# Patient Record
Sex: FEMALE | Race: WHITE | NOT HISPANIC OR LATINO
[De-identification: names, ages, dates, MRNs, and addresses within clinical notes are randomized per-mention and may not be internally consistent; named-entity substitution may affect disease eponyms.]

---

## 2017-02-21 ENCOUNTER — APPOINTMENT (OUTPATIENT)
Dept: OTOLARYNGOLOGY | Facility: CLINIC | Age: 72
End: 2017-02-21

## 2017-02-21 VITALS
HEART RATE: 63 BPM | WEIGHT: 134 LBS | HEIGHT: 63 IN | BODY MASS INDEX: 23.74 KG/M2 | SYSTOLIC BLOOD PRESSURE: 154 MMHG | DIASTOLIC BLOOD PRESSURE: 82 MMHG

## 2017-07-12 ENCOUNTER — TRANSCRIPTION ENCOUNTER (OUTPATIENT)
Age: 72
End: 2017-07-12

## 2017-11-13 ENCOUNTER — FORM ENCOUNTER (OUTPATIENT)
Age: 72
End: 2017-11-13

## 2017-11-14 ENCOUNTER — OUTPATIENT (OUTPATIENT)
Dept: OUTPATIENT SERVICES | Facility: HOSPITAL | Age: 72
LOS: 1 days | End: 2017-11-14
Payer: MEDICARE

## 2017-11-14 ENCOUNTER — APPOINTMENT (OUTPATIENT)
Dept: MAMMOGRAPHY | Facility: CLINIC | Age: 72
End: 2017-11-14
Payer: MEDICARE

## 2017-11-14 DIAGNOSIS — Z00.8 ENCOUNTER FOR OTHER GENERAL EXAMINATION: ICD-10-CM

## 2017-11-14 PROCEDURE — 77067 SCR MAMMO BI INCL CAD: CPT

## 2017-11-14 PROCEDURE — 77063 BREAST TOMOSYNTHESIS BI: CPT

## 2017-11-14 PROCEDURE — 77063 BREAST TOMOSYNTHESIS BI: CPT | Mod: 26

## 2017-11-14 PROCEDURE — G0202: CPT | Mod: 26

## 2018-10-24 ENCOUNTER — OTHER (OUTPATIENT)
Age: 73
End: 2018-10-24

## 2018-11-14 ENCOUNTER — FORM ENCOUNTER (OUTPATIENT)
Age: 73
End: 2018-11-14

## 2018-11-15 ENCOUNTER — APPOINTMENT (OUTPATIENT)
Dept: MAMMOGRAPHY | Facility: CLINIC | Age: 73
End: 2018-11-15
Payer: MEDICARE

## 2018-11-15 ENCOUNTER — OUTPATIENT (OUTPATIENT)
Dept: OUTPATIENT SERVICES | Facility: HOSPITAL | Age: 73
LOS: 1 days | End: 2018-11-15
Payer: MEDICARE

## 2018-11-15 DIAGNOSIS — Z00.8 ENCOUNTER FOR OTHER GENERAL EXAMINATION: ICD-10-CM

## 2018-11-15 PROCEDURE — 77063 BREAST TOMOSYNTHESIS BI: CPT

## 2018-11-15 PROCEDURE — 77067 SCR MAMMO BI INCL CAD: CPT | Mod: 26

## 2018-11-15 PROCEDURE — 77063 BREAST TOMOSYNTHESIS BI: CPT | Mod: 26

## 2018-11-15 PROCEDURE — 77067 SCR MAMMO BI INCL CAD: CPT

## 2019-01-23 ENCOUNTER — TRANSCRIPTION ENCOUNTER (OUTPATIENT)
Age: 74
End: 2019-01-23

## 2019-12-26 ENCOUNTER — TRANSCRIPTION ENCOUNTER (OUTPATIENT)
Age: 74
End: 2019-12-26

## 2020-01-10 ENCOUNTER — MESSAGE (OUTPATIENT)
Age: 75
End: 2020-01-10

## 2020-01-10 DIAGNOSIS — Z00.00 ENCOUNTER FOR GENERAL ADULT MEDICAL EXAMINATION W/OUT ABNORMAL FINDINGS: ICD-10-CM

## 2020-02-10 ENCOUNTER — FORM ENCOUNTER (OUTPATIENT)
Age: 75
End: 2020-02-10

## 2020-02-11 ENCOUNTER — OUTPATIENT (OUTPATIENT)
Dept: OUTPATIENT SERVICES | Facility: HOSPITAL | Age: 75
LOS: 1 days | End: 2020-02-11
Payer: MEDICARE

## 2020-02-11 ENCOUNTER — APPOINTMENT (OUTPATIENT)
Dept: MAMMOGRAPHY | Facility: CLINIC | Age: 75
End: 2020-02-11
Payer: MEDICARE

## 2020-02-11 DIAGNOSIS — Z00.00 ENCOUNTER FOR GENERAL ADULT MEDICAL EXAMINATION WITHOUT ABNORMAL FINDINGS: ICD-10-CM

## 2020-02-11 PROCEDURE — 77063 BREAST TOMOSYNTHESIS BI: CPT

## 2020-02-11 PROCEDURE — 77063 BREAST TOMOSYNTHESIS BI: CPT | Mod: 26

## 2020-02-11 PROCEDURE — 77067 SCR MAMMO BI INCL CAD: CPT | Mod: 26

## 2020-02-11 PROCEDURE — 77067 SCR MAMMO BI INCL CAD: CPT

## 2020-02-14 ENCOUNTER — APPOINTMENT (OUTPATIENT)
Dept: OBGYN | Facility: CLINIC | Age: 75
End: 2020-02-14
Payer: MEDICARE

## 2020-02-14 VITALS
BODY MASS INDEX: 23.92 KG/M2 | DIASTOLIC BLOOD PRESSURE: 70 MMHG | SYSTOLIC BLOOD PRESSURE: 130 MMHG | WEIGHT: 135 LBS | HEIGHT: 63 IN

## 2020-02-14 DIAGNOSIS — H69.83 OTHER SPECIFIED DISORDERS OF EUSTACHIAN TUBE, BILATERAL: ICD-10-CM

## 2020-02-14 DIAGNOSIS — M67.40 GANGLION, UNSPECIFIED SITE: ICD-10-CM

## 2020-02-14 DIAGNOSIS — S52.562A BARTON'S FRACTURE OF LEFT RADIUS, INITIAL ENCOUNTER FOR CLOSED FRACTURE: ICD-10-CM

## 2020-02-14 DIAGNOSIS — Z86.79 PERSONAL HISTORY OF OTHER DISEASES OF THE CIRCULATORY SYSTEM: ICD-10-CM

## 2020-02-14 DIAGNOSIS — H65.93 UNSPECIFIED NONSUPPURATIVE OTITIS MEDIA, BILATERAL: ICD-10-CM

## 2020-02-14 PROCEDURE — G0101: CPT

## 2020-02-14 NOTE — PHYSICAL EXAM
[Awake] : awake [Alert] : alert [Acute Distress] : no acute distress [LAD] : no lymphadenopathy [Thyroid Nodule] : no thyroid nodule [Goiter] : no goiter [Mass] : no breast mass [Nipple Discharge] : no nipple discharge [Soft] : soft [Axillary LAD] : no axillary lymphadenopathy [H/Smegaly] : no hepatosplenomegaly [Distended] : not distended [Tender] : non tender [Oriented x3] : oriented to person, place, and time [Depressed Mood] : not depressed [Flat Affect] : affect not flat [Normal] : cervix [Atrophy] : atrophy [Anteversion] : anteverted [No Bleeding] : there was no active vaginal bleeding [RRR, No Murmurs] : RRR, no murmurs [Tenderness] : nontender [Uterine Adnexae] : were not tender and not enlarged [CTAB] : CTAB

## 2020-02-14 NOTE — CHIEF COMPLAINT
[Initial Visit] : initial GYN visit [FreeTextEntry1] : 75YO P2 LMP 1994 presents for checkup without complaints.

## 2020-02-14 NOTE — HISTORY OF PRESENT ILLNESS
[___ Year(s) Ago] : [unfilled] year(s) ago [Healthy Diet] : a healthy diet [Good] : being in good health [Regular Exercise] : regular exercise [Last Mammogram ___] : Last Mammogram was [unfilled] [Last Pap ___] : Last cervical pap smear was [unfilled] [Postmenopausal] : is postmenopausal [Pregnancy History] : pregnancy history: [Up to Date] : up to date with ~his/her~ STD screening [Weight Concerns] : no concerns with her weight [Menstrual Problems] : reports normal menses

## 2020-02-21 LAB — CYTOLOGY CVX/VAG DOC THIN PREP: ABNORMAL

## 2021-04-06 ENCOUNTER — RESULT REVIEW (OUTPATIENT)
Age: 76
End: 2021-04-06

## 2021-04-06 ENCOUNTER — OUTPATIENT (OUTPATIENT)
Dept: OUTPATIENT SERVICES | Facility: HOSPITAL | Age: 76
LOS: 1 days | End: 2021-04-06
Payer: MEDICARE

## 2021-04-06 ENCOUNTER — APPOINTMENT (OUTPATIENT)
Dept: MAMMOGRAPHY | Facility: CLINIC | Age: 76
End: 2021-04-06
Payer: MEDICARE

## 2021-04-06 DIAGNOSIS — Z00.00 ENCOUNTER FOR GENERAL ADULT MEDICAL EXAMINATION WITHOUT ABNORMAL FINDINGS: ICD-10-CM

## 2021-04-06 PROCEDURE — 77063 BREAST TOMOSYNTHESIS BI: CPT | Mod: 26

## 2021-04-06 PROCEDURE — 77067 SCR MAMMO BI INCL CAD: CPT

## 2021-04-06 PROCEDURE — 77067 SCR MAMMO BI INCL CAD: CPT | Mod: 26

## 2021-04-06 PROCEDURE — 77063 BREAST TOMOSYNTHESIS BI: CPT

## 2022-09-23 ENCOUNTER — NON-APPOINTMENT (OUTPATIENT)
Age: 77
End: 2022-09-23

## 2022-11-29 ENCOUNTER — APPOINTMENT (OUTPATIENT)
Dept: ORTHOPEDIC SURGERY | Facility: CLINIC | Age: 77
End: 2022-11-29

## 2022-11-29 VITALS
WEIGHT: 135 LBS | SYSTOLIC BLOOD PRESSURE: 144 MMHG | BODY MASS INDEX: 23.92 KG/M2 | HEART RATE: 54 BPM | DIASTOLIC BLOOD PRESSURE: 70 MMHG | HEIGHT: 63 IN

## 2022-11-29 PROCEDURE — 99204 OFFICE O/P NEW MOD 45 MIN: CPT

## 2022-11-29 PROCEDURE — 73564 X-RAY EXAM KNEE 4 OR MORE: CPT | Mod: RT

## 2022-11-29 NOTE — ADDENDUM
[FreeTextEntry1] : This note was written by Lourdes Chirinos on 11/29/2022 acting solely as a scribe for Dr. Wayne Cabral.\par \par All medical record entries made by the Scribe were at my, Dr. Wayne Cabral, direction and personally dictated by me on 11/29/2022. I have personally reviewed the chart and agree that the record accurately reflects my personal performance of the history, physical exam, assessment and plan.

## 2022-11-29 NOTE — DISCUSSION/SUMMARY
[de-identified] : 78 y/o female with right knee popliteal cyst\par \par A discussion was had with the patient regarding popliteal synovial cysts, commonly known as a "baker cyst". Synovial cysts are a common condition that causes pain through the posterior knee. They are typically secondary to degenerative or inflammatory joint disease with communication to the knee joint, but may be caused by underlying internal derangement. When symptomatic, they present as pain and stiffness or swelling through the posterior knee worsened with activity. They are at risk of enlargement, rupture or pressure on neurovascular structures through the posterior joint.\par \par Management of the cysts includes treatment of the underlying joint disorder if symptomatic with intra-articular injection of steroid. Local aspiration and injection may be performed under ultrasound guidance with potential recurrence. However, synovial cysts tend to respond to local cryotherapy, activity modification, and anti-inflammatory medication.\par \par Recommendation: Conservative care & observation; including rest/activity avoidance until less symptomatic with subsequent gradual return to full low impact activity as tolerated. Patient may also use OTC NSAIDs or acetaminophen as tolerated, with application of ice/heat to the area 2-3x daily for 20 minutes after periods of activity. \par \par Follow up as needed if pain persists for further evaluation and treatment.

## 2022-11-29 NOTE — HISTORY OF PRESENT ILLNESS
[de-identified] : 77 year old female present today with right knee pain x 1 week. No injury reported. The pain has improved  is brought on with stairs usage, getting out chair and driving. Patient is very active any plays pickle ball 2 x per week but did not play last week. No taking pain medication. Localizes pain to the posterior aspect of the knee. Denies locking, catching, numbness or tingling. \par \par The patient's past medical history, past surgical history, medications and allergies were reviewed by me today with the patient and documented accordingly. In addition, the patient's family and social history, which were noncontributory to this visit, were reviewed also.

## 2022-11-29 NOTE — PHYSICAL EXAM
[de-identified] : Oriented to time, place, person\par Mood: Normal\par Affect: Normal\par Appearance: Healthy, well appearing, no acute distress.\par Gait: Normal\par Assistive Devices: None\par \par Right Knee Exam:\par \par Skin: Clean, dry, intact\par Inspection: No obvious malalignment, no masses, no swelling, no effusion\par Pulses: 2+ DP/PT pulses \par ROM: 0-135 degrees of flexion. No pain with deep knee flexion/extension.\par Tenderness: No MJLT. No LJLT. No pain over the patella facets. No pain to the quadriceps tendon. No pain to the patella tendon. No posterior knee tenderness.\par Stability: Stable to varus, valgus. Negative Lachman testing. Negative anterior drawer, negative posterior drawer.\par Strength: 5/5 Q/H/TA/GS/EHL, without atrophy\par Neuro: Intact to light touch throughout, DTRs normal\par Additional Tests: Negative Amanda's test, Negative patellar grind test  [de-identified] : The following radiographs were ordered and read by me during this patients visit. I reviewed each radiograph in detail with the patient and discussed the findings as highlighted below. \par \par 4 views of the right knee were obtained today, 11/29/2022, that show no acute fracture or dislocation. There is minimal medial, minimal lateral and mild patellofemoral degenerative changes seen. There is no significant malalignment. No significant other obvious osseous abnormality, otherwise unremarkable.

## 2023-01-27 ENCOUNTER — APPOINTMENT (OUTPATIENT)
Dept: MAMMOGRAPHY | Facility: CLINIC | Age: 78
End: 2023-01-27
Payer: MEDICARE

## 2023-01-27 ENCOUNTER — OUTPATIENT (OUTPATIENT)
Dept: OUTPATIENT SERVICES | Facility: HOSPITAL | Age: 78
LOS: 1 days | End: 2023-01-27
Payer: MEDICARE

## 2023-01-27 DIAGNOSIS — Z12.31 ENCOUNTER FOR SCREENING MAMMOGRAM FOR MALIGNANT NEOPLASM OF BREAST: ICD-10-CM

## 2023-01-27 PROCEDURE — 77063 BREAST TOMOSYNTHESIS BI: CPT | Mod: 26

## 2023-01-27 PROCEDURE — 77063 BREAST TOMOSYNTHESIS BI: CPT

## 2023-01-27 PROCEDURE — 77067 SCR MAMMO BI INCL CAD: CPT | Mod: 26

## 2023-01-27 PROCEDURE — 77067 SCR MAMMO BI INCL CAD: CPT

## 2023-03-10 ENCOUNTER — APPOINTMENT (OUTPATIENT)
Dept: OBGYN | Facility: CLINIC | Age: 78
End: 2023-03-10
Payer: MEDICARE

## 2023-03-10 VITALS — WEIGHT: 135 LBS | DIASTOLIC BLOOD PRESSURE: 83 MMHG | SYSTOLIC BLOOD PRESSURE: 153 MMHG | BODY MASS INDEX: 23.91 KG/M2

## 2023-03-10 DIAGNOSIS — Z01.419 ENCOUNTER FOR GYNECOLOGICAL EXAMINATION (GENERAL) (ROUTINE) W/OUT ABNORMAL FINDINGS: ICD-10-CM

## 2023-03-10 PROCEDURE — G0101: CPT

## 2023-03-10 RX ORDER — HYDROCHLOROTHIAZIDE 25 MG/1
25 TABLET ORAL
Refills: 0 | Status: ACTIVE | COMMUNITY
Start: 2023-03-10

## 2023-03-10 RX ORDER — LOSARTAN POTASSIUM 100 MG/1
100 TABLET, FILM COATED ORAL
Refills: 0 | Status: ACTIVE | COMMUNITY
Start: 2023-03-10

## 2023-03-10 NOTE — HISTORY OF PRESENT ILLNESS
[Patient reported mammogram was normal] : Patient reported mammogram was normal [Patient reported PAP Smear was normal] : Patient reported PAP Smear was normal [Patient reported colonoscopy was normal] : Patient reported colonoscopy was normal [FreeTextEntry1] : 78YO P2 LMP 2004 presents for checkup without complaints. [Mammogramdate] : 2/23 [PapSmeardate] : 2020 [ColonoscopyDate] : 2022

## 2023-03-15 LAB — CYTOLOGY CVX/VAG DOC THIN PREP: ABNORMAL

## 2023-03-28 ENCOUNTER — NON-APPOINTMENT (OUTPATIENT)
Age: 78
End: 2023-03-28

## 2023-03-28 ENCOUNTER — APPOINTMENT (OUTPATIENT)
Dept: OTOLARYNGOLOGY | Facility: CLINIC | Age: 78
End: 2023-03-28
Payer: MEDICARE

## 2023-03-28 VITALS
HEART RATE: 60 BPM | WEIGHT: 135 LBS | HEIGHT: 63.5 IN | DIASTOLIC BLOOD PRESSURE: 75 MMHG | SYSTOLIC BLOOD PRESSURE: 144 MMHG | BODY MASS INDEX: 23.62 KG/M2

## 2023-03-28 PROCEDURE — 99203 OFFICE O/P NEW LOW 30 MIN: CPT | Mod: 25

## 2023-03-28 PROCEDURE — 69210 REMOVE IMPACTED EAR WAX UNI: CPT

## 2023-03-28 RX ORDER — BIOTIN 10 MG
TABLET ORAL
Refills: 0 | Status: ACTIVE | COMMUNITY

## 2023-03-28 RX ORDER — VIT A/VIT C/VIT E/ZINC/COPPER 2148-113
TABLET ORAL
Refills: 0 | Status: ACTIVE | COMMUNITY

## 2023-03-28 NOTE — PHYSICAL EXAM
[de-identified] : CI AD [Normal] : mucosa is normal [Midline] : trachea located in midline position

## 2023-03-28 NOTE — REVIEW OF SYSTEMS
[Recent Weight Loss (___ Lbs)] : recent [unfilled] ~Ulb weight loss [Negative] : Heme/Lymph [As Noted in HPI] : as noted in HPI

## 2023-03-28 NOTE — HISTORY OF PRESENT ILLNESS
[de-identified] : 77 yr old female w hx of wax. Flying this weekend, wants to make sure her ears are clear.\par -otalgia, tinnitus, dizzy\par +Qtips\par \par -hx otitis, noise exp, head trauma, FH\par \par s/p Moh's surgery nose 9/2022

## 2023-07-18 ENCOUNTER — APPOINTMENT (OUTPATIENT)
Dept: ORTHOPEDIC SURGERY | Facility: CLINIC | Age: 78
End: 2023-07-18
Payer: MEDICARE

## 2023-07-18 DIAGNOSIS — M25.561 PAIN IN RIGHT KNEE: ICD-10-CM

## 2023-07-18 DIAGNOSIS — M71.21 SYNOVIAL CYST OF POPLITEAL SPACE [BAKER], RIGHT KNEE: ICD-10-CM

## 2023-07-18 PROCEDURE — 99213 OFFICE O/P EST LOW 20 MIN: CPT

## 2023-07-20 PROBLEM — M71.21 SYNOVIAL CYST OF RIGHT POPLITEAL SPACE: Status: ACTIVE | Noted: 2022-11-29

## 2023-07-20 PROBLEM — M25.561 KNEE PAIN, RIGHT: Status: ACTIVE | Noted: 2023-07-20

## 2023-07-20 NOTE — PHYSICAL EXAM
[de-identified] : Oriented to time, place, person\par Mood: Normal\par Affect: Normal\par Appearance: Healthy, well appearing, no acute distress.\par Gait: Normal\par Assistive Devices: None\par \par Right Knee Exam:\par \par Skin: Clean, dry, intact\par Inspection: No obvious malalignment, no masses, no swelling, no effusion\par Pulses: 2+ DP/PT pulses \par ROM: 0-135 degrees of flexion. No pain with deep knee flexion/extension.\par Tenderness: No MJLT. No LJLT. No pain over the patella facets. No pain to the quadriceps tendon. No pain to the patella tendon. No posterior knee tenderness.\par Stability: Stable to varus, valgus. Negative Lachman testing. Negative anterior drawer, negative posterior drawer.\par Strength: 5/5 Q/H/TA/GS/EHL, without atrophy\par Neuro: Intact to light touch throughout, DTRs normal\par Additional Tests: Negative Amanda's test, Negative patellar grind test  [de-identified] : 4 views of the right knee were obtained 11/29/2022, that show no acute fracture or dislocation. There is minimal medial, minimal lateral and mild patellofemoral degenerative changes seen. There is no significant malalignment. No significant other obvious osseous abnormality, otherwise unremarkable.

## 2023-07-20 NOTE — DISCUSSION/SUMMARY
[de-identified] : 76 y/o female with right knee pain\par \par We again discussed the condition to her right knee consistent with degenerative process.  She likely has continued symptoms through the posterior joint from popliteal cyst.  Patient also refers bilateral shoulder pain, and a prescription was provided for strengthening and conditioning through physical therapy.  \par \par Recommendation: Supportive care as previously discussed.\par \par Follow up as needed.

## 2023-07-20 NOTE — ADDENDUM
[FreeTextEntry1] : This note was written by Lourdes Chirinos on 07/18/2023 acting solely as a scribe for Dr. Wayne Cabral.\par \par All medical record entries made by the Scribe were at my, Dr. Wayne Cabral, direction and personally dictated by me on 07/18/2023. I have personally reviewed the chart and agree that the record accurately reflects my personal performance of the history, physical exam, assessment and plan.

## 2023-07-20 NOTE — HISTORY OF PRESENT ILLNESS
[de-identified] : 77 year old female present today for follow up of right knee pain/ popliteal cyst. No injury reported. States that she has pain when she gets out of a chair and here to make sure its okay.  No taking pain medication. Denies locking, catching, numbness or tingling.  Also c/o bilateral upper arm pain. \par \par

## 2023-08-09 ENCOUNTER — APPOINTMENT (OUTPATIENT)
Dept: RADIOLOGY | Facility: CLINIC | Age: 78
End: 2023-08-09
Payer: MEDICARE

## 2023-08-09 ENCOUNTER — OUTPATIENT (OUTPATIENT)
Dept: OUTPATIENT SERVICES | Facility: HOSPITAL | Age: 78
LOS: 1 days | End: 2023-08-09
Payer: MEDICARE

## 2023-08-09 DIAGNOSIS — Z00.8 ENCOUNTER FOR OTHER GENERAL EXAMINATION: ICD-10-CM

## 2023-08-09 PROCEDURE — 73030 X-RAY EXAM OF SHOULDER: CPT

## 2023-08-09 PROCEDURE — 73562 X-RAY EXAM OF KNEE 3: CPT

## 2023-08-09 PROCEDURE — 73562 X-RAY EXAM OF KNEE 3: CPT | Mod: 26,50

## 2023-08-09 PROCEDURE — 73030 X-RAY EXAM OF SHOULDER: CPT | Mod: 26,50

## 2023-09-22 ENCOUNTER — APPOINTMENT (OUTPATIENT)
Dept: RADIOLOGY | Facility: CLINIC | Age: 78
End: 2023-09-22

## 2023-09-22 ENCOUNTER — OUTPATIENT (OUTPATIENT)
Dept: OUTPATIENT SERVICES | Facility: HOSPITAL | Age: 78
LOS: 1 days | End: 2023-09-22
Payer: MEDICARE

## 2023-09-22 DIAGNOSIS — M06.09 RHEUMATOID ARTHRITIS WITHOUT RHEUMATOID FACTOR, MULTIPLE SITES: ICD-10-CM

## 2023-09-22 PROCEDURE — 73110 X-RAY EXAM OF WRIST: CPT

## 2023-09-22 PROCEDURE — 73130 X-RAY EXAM OF HAND: CPT | Mod: 26,50

## 2023-09-22 PROCEDURE — 73130 X-RAY EXAM OF HAND: CPT

## 2023-09-22 PROCEDURE — 73110 X-RAY EXAM OF WRIST: CPT | Mod: 26,50

## 2023-10-02 ENCOUNTER — APPOINTMENT (OUTPATIENT)
Dept: OTOLARYNGOLOGY | Facility: CLINIC | Age: 78
End: 2023-10-02

## 2023-10-09 ENCOUNTER — APPOINTMENT (OUTPATIENT)
Dept: OTOLARYNGOLOGY | Facility: CLINIC | Age: 78
End: 2023-10-09
Payer: MEDICARE

## 2023-10-09 VITALS
SYSTOLIC BLOOD PRESSURE: 138 MMHG | DIASTOLIC BLOOD PRESSURE: 74 MMHG | BODY MASS INDEX: 22.92 KG/M2 | HEIGHT: 63.5 IN | HEART RATE: 78 BPM | WEIGHT: 131 LBS

## 2023-10-09 DIAGNOSIS — H93.299 OTHER ABNORMAL AUDITORY PERCEPTIONS, UNSPECIFIED EAR: ICD-10-CM

## 2023-10-09 DIAGNOSIS — H61.21 IMPACTED CERUMEN, RIGHT EAR: ICD-10-CM

## 2023-10-09 PROCEDURE — 69210 REMOVE IMPACTED EAR WAX UNI: CPT

## 2023-10-09 PROCEDURE — 99213 OFFICE O/P EST LOW 20 MIN: CPT | Mod: 25

## 2023-10-09 RX ORDER — CHROMIUM 200 MCG
TABLET ORAL
Refills: 0 | Status: ACTIVE | COMMUNITY

## 2023-10-09 RX ORDER — AMLODIPINE BESYLATE 5 MG/1
TABLET ORAL
Refills: 0 | Status: ACTIVE | COMMUNITY

## 2023-10-09 RX ORDER — ROSUVASTATIN CALCIUM 20 MG/1
20 TABLET, FILM COATED ORAL
Refills: 0 | Status: ACTIVE | COMMUNITY

## 2023-10-09 RX ORDER — METHOTREXATE 2.5 MG/1
TABLET ORAL
Refills: 0 | Status: ACTIVE | COMMUNITY

## 2023-10-09 RX ORDER — MELOXICAM 7.5 MG/1
7.5 TABLET ORAL
Refills: 0 | Status: ACTIVE | COMMUNITY

## 2023-11-22 ENCOUNTER — APPOINTMENT (OUTPATIENT)
Dept: RADIOLOGY | Facility: CLINIC | Age: 78
End: 2023-11-22
Payer: MEDICARE

## 2023-11-22 ENCOUNTER — OUTPATIENT (OUTPATIENT)
Dept: OUTPATIENT SERVICES | Facility: HOSPITAL | Age: 78
LOS: 1 days | End: 2023-11-22
Payer: MEDICARE

## 2023-11-22 DIAGNOSIS — Z00.8 ENCOUNTER FOR OTHER GENERAL EXAMINATION: ICD-10-CM

## 2023-11-22 PROCEDURE — 71046 X-RAY EXAM CHEST 2 VIEWS: CPT | Mod: 26

## 2023-11-22 PROCEDURE — 71046 X-RAY EXAM CHEST 2 VIEWS: CPT

## 2024-03-31 ENCOUNTER — NON-APPOINTMENT (OUTPATIENT)
Age: 79
End: 2024-03-31

## 2024-07-08 ENCOUNTER — APPOINTMENT (OUTPATIENT)
Dept: OTOLARYNGOLOGY | Facility: CLINIC | Age: 79
End: 2024-07-08

## 2024-08-02 ENCOUNTER — APPOINTMENT (OUTPATIENT)
Dept: OBGYN | Facility: CLINIC | Age: 79
End: 2024-08-02
Payer: MEDICARE

## 2024-08-02 VITALS — WEIGHT: 122 LBS | BODY MASS INDEX: 21.27 KG/M2 | DIASTOLIC BLOOD PRESSURE: 73 MMHG | SYSTOLIC BLOOD PRESSURE: 144 MMHG

## 2024-08-02 DIAGNOSIS — M85.80 OTHER SPECIFIED DISORDERS OF BONE DENSITY AND STRUCTURE, UNSPECIFIED SITE: ICD-10-CM

## 2024-08-02 DIAGNOSIS — H93.299 OTHER ABNORMAL AUDITORY PERCEPTIONS, UNSPECIFIED EAR: ICD-10-CM

## 2024-08-02 DIAGNOSIS — M25.561 PAIN IN RIGHT KNEE: ICD-10-CM

## 2024-08-02 DIAGNOSIS — H61.21 IMPACTED CERUMEN, RIGHT EAR: ICD-10-CM

## 2024-08-02 DIAGNOSIS — N95.2 POSTMENOPAUSAL ATROPHIC VAGINITIS: ICD-10-CM

## 2024-08-02 PROCEDURE — 99459 PELVIC EXAMINATION: CPT

## 2024-08-02 PROCEDURE — 99213 OFFICE O/P EST LOW 20 MIN: CPT

## 2024-08-02 NOTE — PHYSICAL EXAM
[Chaperone Present] : A chaperone was present in the examining room during all aspects of the physical examination [23554] : A chaperone was present during the pelvic exam. [FreeTextEntry2] : Oni [Appropriately responsive] : appropriately responsive [Alert] : alert [No Acute Distress] : no acute distress [No Lymphadenopathy] : no lymphadenopathy [Regular Rate Rhythm] : regular rate rhythm [No Murmurs] : no murmurs [Clear to Auscultation B/L] : clear to auscultation bilaterally [Soft] : soft [Non-tender] : non-tender [Non-distended] : non-distended [No HSM] : No HSM [No Lesions] : no lesions [No Mass] : no mass [Oriented x3] : oriented x3 [Examination Of The Breasts] : a normal appearance [No Masses] : no breast masses were palpable [Labia Minora] : normal [Labia Majora] : normal [Atrophy] : atrophy [No Bleeding] : There was no active vaginal bleeding [Normal] : normal [Normal Position] : in a normal position [Tenderness] : nontender [Uterine Adnexae] : normal

## 2024-08-02 NOTE — HISTORY OF PRESENT ILLNESS
[Patient reported mammogram was normal] : Patient reported mammogram was normal [Patient reported PAP Smear was normal] : Patient reported PAP Smear was normal [Patient reported colonoscopy was normal] : Patient reported colonoscopy was normal [FreeTextEntry1] : 79YO P2 LMP 2004 presents with c/o dry vagina and osteopenia. [Mammogramdate] : 11/23 [PapSmeardate] : 2023 [ColonoscopyDate] : 2023

## 2024-09-27 ENCOUNTER — NON-APPOINTMENT (OUTPATIENT)
Age: 79
End: 2024-09-27

## 2024-10-04 ENCOUNTER — OUTPATIENT (OUTPATIENT)
Dept: OUTPATIENT SERVICES | Facility: HOSPITAL | Age: 79
LOS: 1 days | End: 2024-10-04
Payer: MEDICARE

## 2024-10-04 ENCOUNTER — APPOINTMENT (OUTPATIENT)
Dept: MAMMOGRAPHY | Facility: CLINIC | Age: 79
End: 2024-10-04
Payer: MEDICARE

## 2024-10-04 ENCOUNTER — RESULT REVIEW (OUTPATIENT)
Age: 79
End: 2024-10-04

## 2024-10-04 DIAGNOSIS — Z00.00 ENCOUNTER FOR GENERAL ADULT MEDICAL EXAMINATION WITHOUT ABNORMAL FINDINGS: ICD-10-CM

## 2024-10-04 PROCEDURE — 77063 BREAST TOMOSYNTHESIS BI: CPT | Mod: 26

## 2024-10-04 PROCEDURE — 77067 SCR MAMMO BI INCL CAD: CPT | Mod: 26

## 2024-11-20 PROCEDURE — 77063 BREAST TOMOSYNTHESIS BI: CPT

## 2024-11-20 PROCEDURE — 77067 SCR MAMMO BI INCL CAD: CPT
